# Patient Record
Sex: FEMALE | Race: WHITE | NOT HISPANIC OR LATINO
[De-identification: names, ages, dates, MRNs, and addresses within clinical notes are randomized per-mention and may not be internally consistent; named-entity substitution may affect disease eponyms.]

---

## 2018-11-07 ENCOUNTER — APPOINTMENT (OUTPATIENT)
Dept: CARDIOLOGY | Facility: CLINIC | Age: 43
End: 2018-11-07

## 2018-11-07 VITALS
HEART RATE: 84 BPM | WEIGHT: 139 LBS | SYSTOLIC BLOOD PRESSURE: 130 MMHG | HEIGHT: 62 IN | BODY MASS INDEX: 25.58 KG/M2 | DIASTOLIC BLOOD PRESSURE: 74 MMHG

## 2018-11-07 DIAGNOSIS — Z78.9 OTHER SPECIFIED HEALTH STATUS: ICD-10-CM

## 2018-11-07 DIAGNOSIS — Z82.49 FAMILY HISTORY OF ISCHEMIC HEART DISEASE AND OTHER DISEASES OF THE CIRCULATORY SYSTEM: ICD-10-CM

## 2018-11-07 NOTE — DISCUSSION/SUMMARY
[FreeTextEntry1] : Ms. Lopez is a 44 yo F presenting for evaluation of ventricular bigeminy noted on ECG while at the cardiologist's office in mid Oct. She has had a 2D echo with no structural heart disease, a stress test (reportedly normal by the patient), and 24 hour Holter that showed < 1% ventricular ectopy. PVCs appear to be coming from the RVOT. She states her palpitations are infrequent, but occur mostly at night. \par \par We will further evaluate her symptoms with a longer event monitor (7 days) to determine if her PVC burden is high enough to warrant medications or an ablation. I have also asked her to activate the event monitor whenever she has symptoms. \par \par I have also advised the patient to go to the nearest emergency room if she experiences any chest pain, dyspnea, syncope, or has any other compelling symptoms.\par \par Follow up in 1 month.\par

## 2018-11-07 NOTE — PHYSICAL EXAM
[General Appearance - Well Developed] : well developed [Normal Appearance] : normal appearance [Well Groomed] : well groomed [General Appearance - Well Nourished] : well nourished [General Appearance - In No Acute Distress] : no acute distress [Normal Conjunctiva] : the conjunctiva exhibited no abnormalities [Normal Oral Mucosa] : normal oral mucosa [Normal Oropharynx] : normal oropharynx [FreeTextEntry1] : No JVD, no carotid bruits [Heart Rate And Rhythm] : heart rate and rhythm were normal [Heart Sounds] : normal S1 and S2 [Murmurs] : no murmurs present [Edema] : no peripheral edema present [] : no respiratory distress [Respiration, Rhythm And Depth] : normal respiratory rhythm and effort [Exaggerated Use Of Accessory Muscles For Inspiration] : no accessory muscle use [Auscultation Breath Sounds / Voice Sounds] : lungs were clear to auscultation bilaterally [Bowel Sounds] : normal bowel sounds [Abdomen Soft] : soft [Abdomen Tenderness] : non-tender [Abnormal Walk] : normal gait [Nail Clubbing] : no clubbing of the fingernails [Cyanosis, Localized] : no localized cyanosis [Oriented To Time, Place, And Person] : oriented to person, place, and time [Impaired Insight] : insight and judgment were intact [Affect] : the affect was normal [Mood] : the mood was normal [No Anxiety] : not feeling anxious

## 2018-11-07 NOTE — HISTORY OF PRESENT ILLNESS
[FreeTextEntry1] : Cardiologist: Dr. Mario Monahan \par \par 42 yo F with a one month history of dull left sided chest pain with radiation to left side of neck occasionally. She states chest pain can occur at rest and occurs sporadically. It feels like a heaviness with pressure. No associated symptoms. It is intermittent and can last for a while. Chest pain tends to occur at rest. Not brought on by exertion or with meals.\par \par She has also noted "fluttering" in her heart that she notes predominantly while lying in bed. Episodes are quick and self limited. Not related to any triggers. Generally occurs at rest. No other cardiovascular complaints. She denies dyspnea, orthopnea, PND, lower extremity edema, presyncope or syncope.\par \par She admits to a lot of stress as she is a single mom. She drinks two cups of coffee in the morning and no other caffeine consumption after.\par \par She was evaluated by the cardiologist for ventricular bigeminy and had a normal echo, normal treadmill stress test, and a 24 hour holter monitor that showed a total of 237 vent ectopic beats, comprising less than 1% over 24 hours. PVCs appear to be coming from the RVOT.

## 2018-11-21 ENCOUNTER — TRANSCRIPTION ENCOUNTER (OUTPATIENT)
Age: 43
End: 2018-11-21

## 2018-12-05 ENCOUNTER — APPOINTMENT (OUTPATIENT)
Dept: CARDIOLOGY | Facility: CLINIC | Age: 43
End: 2018-12-05

## 2018-12-05 VITALS
BODY MASS INDEX: 25.4 KG/M2 | HEART RATE: 88 BPM | OXYGEN SATURATION: 98 % | DIASTOLIC BLOOD PRESSURE: 88 MMHG | SYSTOLIC BLOOD PRESSURE: 129 MMHG | WEIGHT: 138 LBS | HEIGHT: 62 IN

## 2018-12-05 DIAGNOSIS — R07.9 CHEST PAIN, UNSPECIFIED: ICD-10-CM

## 2018-12-05 DIAGNOSIS — Z00.00 ENCOUNTER FOR GENERAL ADULT MEDICAL EXAMINATION W/OUT ABNORMAL FINDINGS: ICD-10-CM

## 2018-12-05 DIAGNOSIS — I49.9 CARDIAC ARRHYTHMIA, UNSPECIFIED: ICD-10-CM

## 2018-12-06 PROBLEM — I49.9 VENTRICULAR BIGEMINY: Status: RESOLVED | Noted: 2018-11-07 | Resolved: 2018-12-06

## 2018-12-06 PROBLEM — R07.9 CHEST PAIN: Status: ACTIVE | Noted: 2018-11-07

## 2018-12-06 PROBLEM — Z00.00 ENCOUNTER FOR PREVENTIVE HEALTH EXAMINATION: Status: ACTIVE | Noted: 2018-10-18

## 2018-12-06 NOTE — HISTORY OF PRESENT ILLNESS
[FreeTextEntry1] : Cardiologist: Dr. Mario Monahan \par \par 44 yo F with history of dull left sided chest pain with radiation to left side of neck occasionally. She states chest pain can occur at rest and occurs sporadically. It feels like a heaviness with pressure and tends to occur at rest. Not brought on by exertion or with meals. Still occurs sporadically but she had a negative stress test and echo with no structural heart disease. \par \par She has also noted "fluttering" in her heart that she notes predominantly while lying in bed. Episodes are quick and self limited. Not related to any triggers. No other cardiovascular complaints. She denies dyspnea, orthopnea, PND, lower extremity edema, presyncope or syncope.\par \par She was evaluated by the cardiologist for ventricular bigeminy and had a normal echo, normal treadmill stress test, and a 24 hour holter monitor that showed a total of 237 vent ectopic beats, comprising less than 1% over 24 hours. PVCs appear to be coming from the RVOT.

## 2018-12-06 NOTE — DISCUSSION/SUMMARY
[FreeTextEntry1] : Ms. Lopez is a 44 yo F presenting for evaluation of ventricular bigeminy noted on ECG while at the cardiologist's office in mid Oct. She has had a 2D echo with no structural heart disease, a stress test (reportedly normal by the patient), and 24 hour Holter that showed < 1% ventricular ectopy. PVCs appear to be coming from the RVOT. She states her palpitations are infrequent, but occur mostly at night. \par \par She had a 7 day event monitor that showed ventricular ectopy of 0.09% burden. During patient triggered episodes, she occasionally had sinus rhythm or isolated PVCs. I offered a loop recorder to see if she has any further arrhymias or increase in PVC burden, but the patient was not interested in this. I reassured her and have asked her to follow up with me as needed. \par \par I have also advised the patient to go to the nearest emergency room if she experiences any chest pain, dyspnea, syncope, or has any other compelling symptoms.\par

## 2019-01-07 ENCOUNTER — APPOINTMENT (OUTPATIENT)
Dept: CARDIOLOGY | Facility: CLINIC | Age: 44
End: 2019-01-07

## 2023-04-25 ENCOUNTER — APPOINTMENT (OUTPATIENT)
Dept: CARDIOLOGY | Facility: CLINIC | Age: 48
End: 2023-04-25
Payer: COMMERCIAL

## 2023-04-25 VITALS — BODY MASS INDEX: 25.58 KG/M2 | WEIGHT: 139 LBS | HEIGHT: 62 IN | TEMPERATURE: 97.6 F

## 2023-04-25 VITALS — SYSTOLIC BLOOD PRESSURE: 124 MMHG | HEART RATE: 81 BPM | DIASTOLIC BLOOD PRESSURE: 74 MMHG

## 2023-04-25 DIAGNOSIS — R94.31 ABNORMAL ELECTROCARDIOGRAM [ECG] [EKG]: ICD-10-CM

## 2023-04-25 PROCEDURE — 93000 ELECTROCARDIOGRAM COMPLETE: CPT

## 2023-04-25 PROCEDURE — 99204 OFFICE O/P NEW MOD 45 MIN: CPT | Mod: 25

## 2023-04-25 RX ORDER — DOXYCYCLINE HYCLATE 50 MG/1
50 CAPSULE ORAL
Qty: 30 | Refills: 0 | Status: DISCONTINUED | COMMUNITY
Start: 2018-10-09 | End: 2023-04-25

## 2023-04-25 RX ORDER — CITALOPRAM HYDROBROMIDE 20 MG/1
20 TABLET, FILM COATED ORAL
Qty: 30 | Refills: 0 | Status: DISCONTINUED | COMMUNITY
Start: 2018-05-11 | End: 2023-04-25

## 2023-04-25 RX ORDER — FLUOXETINE HYDROCHLORIDE 10 MG/1
10 CAPSULE ORAL DAILY
Refills: 0 | Status: DISCONTINUED | COMMUNITY
End: 2023-04-25

## 2023-04-25 RX ORDER — ASPIRIN 81 MG/1
81 TABLET, COATED ORAL TWICE DAILY
Refills: 0 | Status: DISCONTINUED | COMMUNITY
End: 2023-04-25

## 2023-04-25 RX ORDER — SULFAMETHOXAZOLE AND TRIMETHOPRIM 800; 160 MG/1; MG/1
800-160 TABLET ORAL
Qty: 14 | Refills: 0 | Status: DISCONTINUED | COMMUNITY
Start: 2018-05-08 | End: 2023-04-25

## 2023-04-25 NOTE — HISTORY OF PRESENT ILLNESS
[FreeTextEntry1] : 48F  (no complications) with dyslipidemia, PVCs and ventricular bigeminy here for palpitations and dyslipidemia. \par \par Former cardiologist: Dr. Hernandez \par EP: Dr. Shanika Flowers\par \par 23\par Increased episodes of palpitations, notices at rest or at night. Lasts minutes to 1/2 hour. No pre-syncope/syncope or CP. \par \par RHODES with exercise. \par \par Episodes of migraines, BP high, palpitations. \par Occurs 10x per year \par \par Sees a nutritionist \par Exercise: stationary bike 3x/week, strength training \par 2 cups of tea in AM\par EtoH holidays\par Trying to drink more water\par \par Never smoked\par \par FMH\par Mother: CHF, HTN\par MGM: CHF (cause of death)\par \par 3/7/23\par , , HDL 77,  (not fasting)\par K 4.3\par AST 19, ALT 25 \par A1c 5.1%\par Mg 2.2\par TSH 1.28\par Hgb 13.7\par \par 4/15/23 , HDL 65, , \par \par \par

## 2023-04-25 NOTE — REVIEW OF SYSTEMS
[Headache] : headache [SOB] : no shortness of breath [Dyspnea on exertion] : dyspnea during exertion [Chest Discomfort] : no chest discomfort [Lower Ext Edema] : no extremity edema [Leg Claudication] : no intermittent leg claudication [Palpitations] : palpitations [Orthopnea] : no orthopnea [PND] : no PND [Syncope] : no syncope [Cough] : no cough [Abdominal Pain] : no abdominal pain [Joint Pain] : no joint pain [Rash] : no rash [Dizziness] : no dizziness

## 2023-04-25 NOTE — PHYSICAL EXAM
[Well Developed] : well developed [Well Nourished] : well nourished [No Acute Distress] : no acute distress [No Carotid Bruit] : no carotid bruit [Normal S1, S2] : normal S1, S2 [No Murmur] : no murmur [No Rub] : no rub [No Gallop] : no gallop [Clear Lung Fields] : clear lung fields [Good Air Entry] : good air entry [No Respiratory Distress] : no respiratory distress  [Soft] : abdomen soft [Non Tender] : non-tender [No Masses/organomegaly] : no masses/organomegaly [Normal Bowel Sounds] : normal bowel sounds [Moves all extremities] : moves all extremities [No Focal Deficits] : no focal deficits [Normal Speech] : normal speech [No edema] : no edema [No varicosities] : no varicosities [No chronic venous stasis changes] : no chronic venous stasis changes [No rashes] : no rashes

## 2023-04-25 NOTE — ASSESSMENT
[FreeTextEntry1] : Assessment:\par #Palpitations\par - Episodes last minutes to 1/2 hour\par #Dyslipidemia \par - 4/15/23 , HDL 65, ,  - fasting, low ASCVD risk of 1%\par #PVCs\par #FMH of CHF\par #Prevention counseling\par - BMI 25, normotensive, non-smoker \par \par Plan:\par - MCOT x2 weeks given worsening palpitations\par - Echo to assess for structural abnormalities and for screening given FMH of heart failure\par - Lifestyle modification for dyslipidemia\par - Counseled patient on diet and exercise\par - Decrease caffeine, increase water\par - Return to clinic after testing, will seen metanephrines if continues to have migraines +episodes of elevated BP

## 2023-06-14 ENCOUNTER — APPOINTMENT (OUTPATIENT)
Dept: CARDIOLOGY | Facility: CLINIC | Age: 48
End: 2023-06-14
Payer: COMMERCIAL

## 2023-06-14 PROCEDURE — 93306 TTE W/DOPPLER COMPLETE: CPT

## 2023-06-15 ENCOUNTER — TRANSCRIPTION ENCOUNTER (OUTPATIENT)
Age: 48
End: 2023-06-15

## 2023-06-20 ENCOUNTER — APPOINTMENT (OUTPATIENT)
Dept: CARDIOLOGY | Facility: CLINIC | Age: 48
End: 2023-06-20
Payer: COMMERCIAL

## 2023-06-20 VITALS — TEMPERATURE: 97.6 F | BODY MASS INDEX: 25.4 KG/M2 | WEIGHT: 138 LBS | HEIGHT: 62 IN

## 2023-06-20 VITALS — DIASTOLIC BLOOD PRESSURE: 82 MMHG | SYSTOLIC BLOOD PRESSURE: 120 MMHG | HEART RATE: 85 BPM

## 2023-06-20 PROCEDURE — 99214 OFFICE O/P EST MOD 30 MIN: CPT

## 2023-06-23 NOTE — REVIEW OF SYSTEMS
[Headache] : headache [Palpitations] : palpitations [SOB] : no shortness of breath [Dyspnea on exertion] : not dyspnea during exertion [Chest Discomfort] : no chest discomfort [Lower Ext Edema] : no extremity edema [Leg Claudication] : no intermittent leg claudication [Orthopnea] : no orthopnea [PND] : no PND [Syncope] : no syncope [Cough] : no cough [Abdominal Pain] : no abdominal pain [Joint Pain] : no joint pain [Rash] : no rash [Dizziness] : no dizziness

## 2023-06-23 NOTE — HISTORY OF PRESENT ILLNESS
[FreeTextEntry1] : 48F  (no complications) with dyslipidemia, RVOT PVCs and ventricular bigeminy here for palpitations and dyslipidemia. \par \par Former cardiologist: Dr. Hernandez \par EP: Dr. Shanika Flowers\par \par 23\par Intermittent palpitations lasting seconds, occur on a daily basis. She notices the palpitations, but no lightheadedness, dizziness, pre-syncope/syncope. \par \par Migraine over the weekend. No palpitations. BP okay at home. \par \par Former cardiologist Dr. Hernandez did a work up for ventricular bigeminy. Showed a normal echo, normal treadmill stress test, and a 24 hour holter monitor that showed a total of 237 vent ectopic beats, comprising less than 1% over 24 hours. \par \par 23\par Increased episodes of palpitations, notices at rest or at night. Lasts minutes to 1/2 hour. No pre-syncope/syncope or CP. \par \par RHODES with exercise. \par \par Episodes of migraines, BP high, palpitations. \par Occurs 10x per year \par \par Sees a nutritionist \par Exercise: stationary bike 3x/week, strength training \par 2 cups of tea in AM\par EtoH holidays\par Trying to drink more water\par \par Never smoked\par \par FMH\par Mother: CHF, HTN\par MGM: CHF (cause of death)\par \par 3/7/23\par , , HDL 77,  (not fasting)\par K 4.3\par AST 19, ALT 25 \par A1c 5.1%\par Mg 2.2\par TSH 1.28\par Hgb 13.7\par \par 4/15/23 , HDL 65, , \par \par \par

## 2023-06-23 NOTE — ASSESSMENT
[FreeTextEntry1] : Assessment:\par #Palpitations\par - Episodes last minutes to 1/2 hour\par - MCOT 4/2023 worn for 11 days showed brief episodes of SVT and 8 beats NSVT\par - TTE 6/14/2023 normal LV sys function, LVEF 50-55%, normal RV size/function, normal atria\par #Dyslipidemia \par - 4/15/23 , HDL 65, ,  - fasting, low ASCVD risk of 1%\par #RVOT PVCs\par #FMH of CHF\par #Prevention counseling\par - BMI 25, normotensive, non-smoker \par \par 6/8/23\par , TG 85, HDL 70, \par Cr 0.85\par A1c 4.9%\par \par Plan:\par - Discussed metoprolol tartrate 25 mg BID PRN for palpitations\par - Recommend patient establish with EP Dr. Flowers again\par - Lifestyle modification for dyslipidemia\par - Counseled patient on diet and exercise\par - Decrease caffeine, increase water\par - Return to clinic 6 months

## 2023-07-26 ENCOUNTER — APPOINTMENT (OUTPATIENT)
Dept: ELECTROPHYSIOLOGY | Facility: CLINIC | Age: 48
End: 2023-07-26
Payer: COMMERCIAL

## 2023-07-26 VITALS
BODY MASS INDEX: 25.4 KG/M2 | WEIGHT: 138 LBS | DIASTOLIC BLOOD PRESSURE: 71 MMHG | HEIGHT: 62 IN | HEART RATE: 76 BPM | SYSTOLIC BLOOD PRESSURE: 124 MMHG | TEMPERATURE: 98 F

## 2023-07-26 PROCEDURE — 99204 OFFICE O/P NEW MOD 45 MIN: CPT | Mod: 25

## 2023-07-26 PROCEDURE — 93000 ELECTROCARDIOGRAM COMPLETE: CPT

## 2023-07-26 RX ORDER — METOPROLOL TARTRATE 25 MG/1
25 TABLET, FILM COATED ORAL
Qty: 180 | Refills: 1 | Status: COMPLETED | COMMUNITY
Start: 2023-06-20 | End: 2023-07-26

## 2023-07-26 NOTE — HISTORY OF PRESENT ILLNESS
[FreeTextEntry1] : \par Cardiologist: Dr. Mario Matthews --> Dr. Juan\par \par 49 yo F with history of dull left sided chest pain with radiation to left side of neck occasionally. She states chest pain can occur at rest and occurs sporadically. It feels like a heaviness with pressure and tends to occur at rest. Not brought on by exertion or with meals. Still occurs sporadically but she had a negative stress test and echo with no structural heart disease. \par \par She has also noted "fluttering" in her heart that she notes predominantly while lying in bed. Episodes are quick and self limited. Not related to any triggers. No other cardiovascular complaints. She denies dyspnea, orthopnea, PND, lower extremity edema, presyncope or syncope.\par \par She was evaluated by the cardiologist for ventricular bigeminy and had a normal echo, normal treadmill stress test, and a 24 hour Holter monitor that showed a total of 237 vent ectopic beats, comprising less than 1% over 24 hours. PVCs appear to be coming from the RVOT. \elias randolph Has not been seen since 2018. Now referred for increased palpitations especially at night time. Episodes last anywhere from a few minutes to about an hour. No triggers. Drinks tea (sometimes with caffeine). Had a 2 week MCOT with 0% PVC burden.

## 2023-07-26 NOTE — ASSESSMENT
[FreeTextEntry1] : 47 yo F with history of PVCs here to reestablish care for palpitations. \par \par # PVC, low voltage, palpitations\par - Prev had 2D echo with no structural heart disease, a stress test (reportedly normal by the patient), and 24 hour Holter that showed < 1% ventricular ectopy. PVCs appeared to be coming from RVOT. \par - Cardiac MRI to assess for infiltrative heart disease yusuf given NSVT, PVCs, low voltage and nonsustained atrial arrhythmias. \par - Consider repeating monitor at next visit. Discussed option of long term monitoring with loop recorder, but patient wishes to defer any invasive procedures at this time. \par - Pulm referral for LORENA eval since pt does snore. \par - Patient did not fill her script for BB. Advised pt to fill it and take it as needed (as prescribed by her cardio).\par \par I have also advised the patient to go to the nearest emergency room if she experiences any chest pain, dyspnea, syncope, or has any other compelling symptoms.\par \par Follow up in 4-6 months

## 2023-07-26 NOTE — CARDIOLOGY SUMMARY
[de-identified] : 7/26/2023 NSR (HR 74 bpm), low voltage [de-identified] : 4/25-5/8/2023 12 days\par AVG HR 79 bpm (HR range  bpm)\par SVT, 8 beats of WCT (?NSVT)\par 0% PVC burden\par 10 symptom episodes c/w AFib/AFlutter, paroxysmal SVT and sinus rhythm. Longest AF < 10 sec [de-identified] : 10/12/18, No significant valvular heart disease LVEF 65%.

## 2023-10-09 ENCOUNTER — APPOINTMENT (OUTPATIENT)
Dept: PULMONOLOGY | Facility: CLINIC | Age: 48
End: 2023-10-09
Payer: COMMERCIAL

## 2023-10-09 VITALS
DIASTOLIC BLOOD PRESSURE: 94 MMHG | SYSTOLIC BLOOD PRESSURE: 148 MMHG | OXYGEN SATURATION: 98 % | HEIGHT: 62 IN | HEART RATE: 77 BPM | BODY MASS INDEX: 24.84 KG/M2 | WEIGHT: 135 LBS

## 2023-10-09 DIAGNOSIS — G47.33 OBSTRUCTIVE SLEEP APNEA (ADULT) (PEDIATRIC): ICD-10-CM

## 2023-10-09 PROCEDURE — 99203 OFFICE O/P NEW LOW 30 MIN: CPT

## 2023-10-10 PROBLEM — G47.33 OBSTRUCTIVE SLEEP APNEA, ADULT: Status: ACTIVE | Noted: 2023-10-10

## 2023-10-11 ENCOUNTER — OUTPATIENT (OUTPATIENT)
Dept: OUTPATIENT SERVICES | Facility: HOSPITAL | Age: 48
LOS: 1 days | Discharge: HOME | End: 2023-10-11
Payer: COMMERCIAL

## 2023-10-11 ENCOUNTER — APPOINTMENT (OUTPATIENT)
Dept: SLEEP CENTER | Facility: HOSPITAL | Age: 48
End: 2023-10-11
Payer: COMMERCIAL

## 2023-10-11 DIAGNOSIS — G47.33 OBSTRUCTIVE SLEEP APNEA (ADULT) (PEDIATRIC): ICD-10-CM

## 2023-10-11 PROCEDURE — 95800 SLP STDY UNATTENDED: CPT | Mod: 26

## 2023-10-11 PROCEDURE — 95800 SLP STDY UNATTENDED: CPT

## 2023-10-14 DIAGNOSIS — G47.33 OBSTRUCTIVE SLEEP APNEA (ADULT) (PEDIATRIC): ICD-10-CM

## 2023-10-24 ENCOUNTER — OUTPATIENT (OUTPATIENT)
Dept: OUTPATIENT SERVICES | Facility: HOSPITAL | Age: 48
LOS: 1 days | End: 2023-10-24
Payer: COMMERCIAL

## 2023-10-24 DIAGNOSIS — Z00.8 ENCOUNTER FOR OTHER GENERAL EXAMINATION: ICD-10-CM

## 2023-10-24 PROCEDURE — A9579: CPT

## 2023-10-24 PROCEDURE — 75561 CARDIAC MRI FOR MORPH W/DYE: CPT | Mod: 26

## 2023-10-24 PROCEDURE — 75561 CARDIAC MRI FOR MORPH W/DYE: CPT

## 2023-10-25 DIAGNOSIS — Z00.8 ENCOUNTER FOR OTHER GENERAL EXAMINATION: ICD-10-CM

## 2023-12-05 ENCOUNTER — APPOINTMENT (OUTPATIENT)
Dept: CARDIOLOGY | Facility: CLINIC | Age: 48
End: 2023-12-05
Payer: COMMERCIAL

## 2023-12-05 VITALS — WEIGHT: 140.44 LBS | TEMPERATURE: 97.6 F | HEIGHT: 62 IN | BODY MASS INDEX: 25.84 KG/M2

## 2023-12-05 VITALS — DIASTOLIC BLOOD PRESSURE: 80 MMHG | SYSTOLIC BLOOD PRESSURE: 136 MMHG

## 2023-12-05 DIAGNOSIS — Z71.89 OTHER SPECIFIED COUNSELING: ICD-10-CM

## 2023-12-05 DIAGNOSIS — Z71.3 DIETARY COUNSELING AND SURVEILLANCE: ICD-10-CM

## 2023-12-05 DIAGNOSIS — Z71.82 EXERCISE COUNSELING: ICD-10-CM

## 2023-12-05 PROCEDURE — 99214 OFFICE O/P EST MOD 30 MIN: CPT

## 2023-12-05 RX ORDER — DIAZEPAM 5 MG/1
5 TABLET ORAL
Qty: 2 | Refills: 0 | Status: COMPLETED | COMMUNITY
Start: 2023-08-02 | End: 2023-12-05

## 2024-01-16 DIAGNOSIS — K76.9 LIVER DISEASE, UNSPECIFIED: ICD-10-CM

## 2024-02-14 ENCOUNTER — APPOINTMENT (OUTPATIENT)
Dept: ELECTROPHYSIOLOGY | Facility: CLINIC | Age: 49
End: 2024-02-14
Payer: COMMERCIAL

## 2024-02-14 VITALS
HEIGHT: 62 IN | DIASTOLIC BLOOD PRESSURE: 94 MMHG | WEIGHT: 135 LBS | BODY MASS INDEX: 24.84 KG/M2 | SYSTOLIC BLOOD PRESSURE: 140 MMHG | RESPIRATION RATE: 18 BRPM | TEMPERATURE: 97.1 F | HEART RATE: 80 BPM

## 2024-02-14 PROCEDURE — 99214 OFFICE O/P EST MOD 30 MIN: CPT | Mod: 25

## 2024-02-14 PROCEDURE — 93000 ELECTROCARDIOGRAM COMPLETE: CPT

## 2024-02-14 RX ORDER — MULTIVITAMIN
TABLET ORAL
Refills: 0 | Status: ACTIVE | COMMUNITY

## 2024-02-14 RX ORDER — TRIAMCINOLONE ACETONIDE 0.25 MG/G
0.03 CREAM TOPICAL
Qty: 45 | Refills: 0 | Status: ACTIVE | COMMUNITY
Start: 2018-10-09

## 2024-02-14 NOTE — CARDIOLOGY SUMMARY
[de-identified] : 2/14/2024 NSR (HR 80 bpm), low voltage 7/26/2023 NSR (HR 74 bpm), low voltage [de-identified] : 4/25-5/8/2023 12 days AVG HR 79 bpm (HR range  bpm); SVT, 8 beats of WCT (?NSVT) 0% PVC burden 10 symptom episodes c/w AFib/AFlutter, paroxysmal SVT and sinus rhythm. Longest AF < 10 sec [de-identified] : 10/12/2018 No significant valvular heart disease LVEF 65%.

## 2024-02-14 NOTE — ASSESSMENT
[FreeTextEntry1] : 47 yo F with history of PVCs here to reestablish care for palpitations.   # PVC, low voltage QRS, palpitations - 2D echo with no structural heart disease, a stress test (reportedly normal by the patient), and 24 hour Holter with < 1% ventricular ectopy. PVCs appeared to be coming from RVOT.  - Cardiac MRI with lateral wall LGE (myocarditis vs sarcoid). Rec FDG/PET to rule out cardiac sarcoid.  - Repeat event monitor as she seems so have more symptoms.  - Does not have LORENA.  - Patient does not wish to take BB.   I have also advised the patient to go to the nearest emergency room if she experiences any chest pain, dyspnea, syncope, or has any other compelling symptoms.  Follow up in 6 weeks to review repeat event monitor and FDG/PET

## 2024-02-14 NOTE — HISTORY OF PRESENT ILLNESS
[FreeTextEntry1] :  Cardiologist: Dr. Mario Matthews --> Dr. Juan  50 yo F with history of dull left sided chest pain with radiation to left side of neck occasionally. She states chest pain can occur at rest and occurs sporadically. It feels like a heaviness with pressure and tends to occur at rest. Not brought on by exertion or with meals. Still occurs sporadically but she had a negative stress test and echo with no structural heart disease.   She has also noted "fluttering" in her heart that she notes predominantly while lying in bed. Episodes are quick and self limited. Not related to any triggers.   She was evaluated by the cardiologist for ventricular bigeminy and had a normal echo, normal treadmill stress test, and a 24 hour Holter monitor that showed a total of 237 vent ectopic beats, comprising less than 1% over 24 hours. PVCs appear to be coming from the RVOT.   Has not been seen since 2018. Now referred for increased palpitations especially at night time. Episodes last anywhere from a few minutes to about an hour. No triggers. Drinks tea (sometimes with caffeine). Had a 2 week MCOT with 0% PVC burden.   2/14/2024 Here for routine follow up after cardiac MRI. Still with daily symptoms. Not taking BB because she does not want to. Has a lot of stress at home. Went for sleep apnea eval and was told she does not have LORENA.

## 2024-03-10 PROBLEM — Z71.82 EXERCISE COUNSELING: Status: ACTIVE | Noted: 2023-04-25

## 2024-04-10 ENCOUNTER — APPOINTMENT (OUTPATIENT)
Dept: ELECTROPHYSIOLOGY | Facility: CLINIC | Age: 49
End: 2024-04-10
Payer: COMMERCIAL

## 2024-04-10 VITALS
HEART RATE: 85 BPM | HEIGHT: 62 IN | SYSTOLIC BLOOD PRESSURE: 132 MMHG | BODY MASS INDEX: 25.76 KG/M2 | WEIGHT: 140 LBS | RESPIRATION RATE: 18 BRPM | DIASTOLIC BLOOD PRESSURE: 84 MMHG

## 2024-04-10 PROCEDURE — 93000 ELECTROCARDIOGRAM COMPLETE: CPT | Mod: 59

## 2024-04-10 PROCEDURE — 93228 REMOTE 30 DAY ECG REV/REPORT: CPT

## 2024-04-10 PROCEDURE — 99214 OFFICE O/P EST MOD 30 MIN: CPT | Mod: 25

## 2024-04-10 NOTE — CARDIOLOGY SUMMARY
[de-identified] : 4/10/2024 NSR (HR 85 bpm), LAD, low voltage 2/14/2024 NSR (HR 80 bpm), low voltage 7/26/2023 NSR (HR 74 bpm), low voltage [de-identified] : 2/14-3/14/2024 16 days. AVG HR 77 bpm (range  bpm), 19 beats of SVT. 1% PVCs. 1 sx episode c/w sinus tach a/w activity. 4/25-5/8/2023 12 days AVG HR 79 bpm (HR range  bpm); SVT, 8 beats of WCT (?NSVT) 0% PVC burden 10 symptom episodes c/w AFib/AFlutter, paroxysmal SVT and sinus rhythm. Longest AF < 10 sec [de-identified] : 6/14/2023 ECHO: EF 50-55%. Normal atria. No sig valvular disease.  10/12/2018 No significant valvular heart disease LVEF 65%.

## 2024-04-10 NOTE — HISTORY OF PRESENT ILLNESS
[FreeTextEntry1] : Cardiologist: Dr. Mario Matthews --> Dr. Juan  48 yo F with history of dull left sided chest pain with radiation to left side of neck occasionally. She states chest pain can occur at rest and occurs sporadically. It feels like a heaviness with pressure and tends to occur at rest. Not brought on by exertion or with meals. Still occurs sporadically but she had a negative stress test and echo with no structural heart disease.   She has also noted "fluttering" in her heart that she notes predominantly while lying in bed. Episodes are quick and self limited. Not related to any triggers.   She was evaluated by the cardiologist for ventricular bigeminy and had a normal echo, normal treadmill stress test, and a 24 hour Holter monitor that showed a total of 237 vent ectopic beats, comprising less than 1% over 24 hours. PVCs appear to be coming from the RVOT.   Has not been seen since 2018. Now referred for increased palpitations especially at night time. Episodes last anywhere from a few minutes to about an hour. No triggers. Drinks tea (sometimes with caffeine). Had a 2 week MCOT with 0% PVC burden.   2/14/2024 Here for routine follow up after cardiac MRI. Still with daily symptoms. Not taking BB because she does not want to. Has a lot of stress at home. Went for sleep apnea eval and was told she does not have LORENA.  4/10/2024 Here for follow up of MCOT. Did not go for FDG/PET because she is nervous about the radiation. Denies any history of syncope. Still has daily palpitations.

## 2024-04-10 NOTE — PHYSICAL EXAM
[Well Developed] : well developed [Well Nourished] : well nourished [No Acute Distress] : no acute distress [Normal S1, S2] : normal S1, S2 [Clear Lung Fields] : clear lung fields [Good Air Entry] : good air entry [No Respiratory Distress] : no respiratory distress  [Soft] : abdomen soft [Normal Gait] : normal gait [No Edema] : no edema [Moves all extremities] : moves all extremities [Normal Speech] : normal speech [Alert and Oriented] : alert and oriented

## 2024-04-10 NOTE — ASSESSMENT
[FreeTextEntry1] : 49 yo F with history of PVCs here to reestablish care for palpitations.   # PVC, low voltage QRS, palpitations - 2D echo with no structural heart disease, a stress test (reportedly normal by the patient), and 24 hour Holter with < 1% ventricular ectopy. PVCs appeared to be coming from RVOT.  - Cardiac MRI with lateral wall LGE (myocarditis vs sarcoid). Rec FDG/PET to rule out cardiac sarcoid but pt wishes to avoid radiation. Asked pt to follow up with heart failure for further evaluation and work up. Provided info for heart failure team.   - Repeat event monitor with 1% PVCs and no sig arrhythmias.  - Does not have LORENA.  - Patient does not wish to take BB.   I have also advised the patient to go to the nearest emergency room if she experiences any chest pain, dyspnea, syncope, or has any other compelling symptoms.  Follow up in 1 year or sooner if she has worsening symptoms.

## 2024-05-23 ENCOUNTER — TRANSCRIPTION ENCOUNTER (OUTPATIENT)
Age: 49
End: 2024-05-23

## 2024-06-18 ENCOUNTER — APPOINTMENT (OUTPATIENT)
Dept: CARDIOLOGY | Facility: CLINIC | Age: 49
End: 2024-06-18
Payer: COMMERCIAL

## 2024-06-18 VITALS — BODY MASS INDEX: 25.58 KG/M2 | WEIGHT: 139 LBS | HEIGHT: 62 IN

## 2024-06-18 VITALS — SYSTOLIC BLOOD PRESSURE: 146 MMHG | DIASTOLIC BLOOD PRESSURE: 82 MMHG

## 2024-06-18 DIAGNOSIS — R00.2 PALPITATIONS: ICD-10-CM

## 2024-06-18 DIAGNOSIS — E78.5 HYPERLIPIDEMIA, UNSPECIFIED: ICD-10-CM

## 2024-06-18 DIAGNOSIS — R03.0 ELEVATED BLOOD-PRESSURE READING, W/OUT DIAGNOSIS OF HYPERTENSION: ICD-10-CM

## 2024-06-18 DIAGNOSIS — I49.3 VENTRICULAR PREMATURE DEPOLARIZATION: ICD-10-CM

## 2024-06-18 PROCEDURE — 99214 OFFICE O/P EST MOD 30 MIN: CPT

## 2024-06-18 RX ORDER — METOPROLOL TARTRATE 25 MG/1
25 TABLET, FILM COATED ORAL DAILY
Refills: 0 | Status: COMPLETED | COMMUNITY
End: 2024-06-18

## 2024-06-19 PROBLEM — I49.3 PVC (PREMATURE VENTRICULAR CONTRACTION): Status: ACTIVE | Noted: 2023-07-26

## 2024-06-19 PROBLEM — R03.0 ELEVATED BLOOD PRESSURE READING: Status: ACTIVE | Noted: 2024-06-19

## 2024-06-19 PROBLEM — R00.2 PALPITATIONS: Status: ACTIVE | Noted: 2018-12-06

## 2024-06-19 NOTE — CARDIOLOGY SUMMARY
[de-identified] : EKG 4/10/24 Normal sinus rhythm 85 bpm low voltage, no PVCs  EKG 7/26/23 low voltage 74 bpm EKG 4/25/23 Normal sinus rhythm 81 bpm, LAD  [de-identified] : Cardiac MR 10/24/23 Normal biventricular function and volume Small focal subepicardial late gadolinium enhancement in the mid lateral wall of the left ventricle raising possibility of sequela of myocarditis with differential of infiltrative disease such as sarcoidosis. Partially imaged possibly hepatic 5.7 cm hyperintense lesion. Recommend correlation with CT or MR abdomen with and without contrast for complete characterization 1.2 cm hyperintense lesion within the right medial breast. Dedicated breast imaging recommended.

## 2024-06-19 NOTE — HISTORY OF PRESENT ILLNESS
[FreeTextEntry1] : 49F  (no complications) with dyslipidemia, RVOT PVCs and ventricular bigeminy here for palpitations and dyslipidemia.   Former cardiologist: Dr. Hernandez  EP: Dr. Shanika Flowers  24 Increased stress with 18 yo son, Mother in/out of hospital. Developed severe headache. /100. Took metop tartarte nightly for 3 days with improvement. Checking BP daily 130/88.  Continues to have palpitations, worse with stress, that are bearable. No pre-syncope/syncope. Not interested in further workup at this time.   23 Continues to have palpitations. Notices them at night. Can last up to 20 minutes. No lightheadedness/dizziness. No LOC She filled, but has not used metoprolol tartrate PRN as palpitations are not unbearable.   Patient reviewed MRI results on her own.  She had recent mammo/US normal Had imaging of abdomen/liver  23 Intermittent palpitations lasting seconds, occur on a daily basis. She notices the palpitations, but no lightheadedness, dizziness, pre-syncope/syncope.   Migraine over the weekend. No palpitations. BP okay at home.   Former cardiologist Dr. Hernandez did a work up for ventricular bigeminy. Showed a normal echo, normal treadmill stress test, and a 24 hour holter monitor that showed a total of 237 vent ectopic beats, comprising less than 1% over 24 hours.   23 Increased episodes of palpitations, notices at rest or at night. Lasts minutes to 1/2 hour. No pre-syncope/syncope or CP.   RHODES with exercise.   Episodes of migraines, BP high, palpitations.  Occurs 10x per year   Sees a nutritionist  Exercise: stationary bike 3x/week, strength training  2 cups of tea in AM EtoH holidays Trying to drink more water  Never smoked  FMH Mother: CHF, HTN MGM: CHF (cause of death)  3/7/23 , , HDL 77,  (not fasting) K 4.3 AST 19, ALT 25  A1c 5.1% Mg 2.2 TSH 1.28 Hgb 13.7  4/15/23 , HDL 65, ,

## 2024-06-19 NOTE — REVIEW OF SYSTEMS
[Palpitations] : palpitations [Headache] : no headache [SOB] : no shortness of breath [Dyspnea on exertion] : not dyspnea during exertion [Chest Discomfort] : no chest discomfort [Lower Ext Edema] : no extremity edema [Leg Claudication] : no intermittent leg claudication [Orthopnea] : no orthopnea [PND] : no PND [Syncope] : no syncope [Cough] : no cough [Abdominal Pain] : no abdominal pain [Joint Pain] : no joint pain [Rash] : no rash [Dizziness] : no dizziness [Under Stress] : under stress

## 2024-06-19 NOTE — ASSESSMENT
- Chair dose of Combigan [FreeTextEntry1] : Assessment: #PVC, low voltage QRS, Palpitations - Episodes last minutes to 1/2 hour - MCOT 4/2023 worn for 11 days showed brief episodes of SVT and 8 beats NSVT - TTE 6/14/2023 normal LV sys function, LVEF 50-55%, normal RV size/function, normal atria - Cardiac MRI with lateral wall LGE (myocarditis vs sarcoid). Patient does not wish to pursue FDG/PET to rule out cardiac sarcoid or see heart failure team #Dyslipidemia - 4/15/23 , HDL 65, ,  - fasting, low ASCVD risk of 1% - 6/8/23 , TG 85, HDL 70,  #RVOT PVCs #[facility] of CHF #Prevention counseling - BMI 25, normotensive, non-smoker  Plan: - Labs, team will discuss results over phone - Monitor BP at home, she will notify me if BP persistently >140/90 and will start ARB or amlodipine - Continue metoprolol tartrate 25 mg BID PRN for palpitations - Lifestyle modification for dyslipidemia - Decrease caffeine, increase water - Stress management - Encouraged plant-based and Mediterranean diets, along with increased fruit, nut, vegetable, legume, and lean vegetable or animal protein (preferably fish) consumption - Engage in at least 150 minutes per week of accumulated moderate-intensity aerobic physical activity or 75 minutes per week of vigorous-intensity aerobic physical activity - Return to clinic 6 months or sooner PRN

## 2024-12-17 ENCOUNTER — APPOINTMENT (OUTPATIENT)
Dept: CARDIOLOGY | Facility: CLINIC | Age: 49
End: 2024-12-17

## 2025-04-02 ENCOUNTER — APPOINTMENT (OUTPATIENT)
Dept: ELECTROPHYSIOLOGY | Facility: CLINIC | Age: 50
End: 2025-04-02